# Patient Record
Sex: MALE | Race: BLACK OR AFRICAN AMERICAN | URBAN - METROPOLITAN AREA
[De-identification: names, ages, dates, MRNs, and addresses within clinical notes are randomized per-mention and may not be internally consistent; named-entity substitution may affect disease eponyms.]

---

## 2023-01-31 ENCOUNTER — OFFICE (OUTPATIENT)
Dept: URBAN - METROPOLITAN AREA CLINIC 92 | Facility: CLINIC | Age: 58
Setting detail: OPHTHALMOLOGY
End: 2023-01-31
Payer: MEDICAID

## 2023-01-31 DIAGNOSIS — H25.13: ICD-10-CM

## 2023-01-31 DIAGNOSIS — H40.033: ICD-10-CM

## 2023-01-31 DIAGNOSIS — H52.4: ICD-10-CM

## 2023-01-31 PROCEDURE — 92250 FUNDUS PHOTOGRAPHY W/I&R: CPT | Performed by: SPECIALIST

## 2023-01-31 PROCEDURE — 92015 DETERMINE REFRACTIVE STATE: CPT | Performed by: SPECIALIST

## 2023-01-31 PROCEDURE — 92012 INTRM OPH EXAM EST PATIENT: CPT | Performed by: SPECIALIST

## 2023-01-31 ASSESSMENT — REFRACTION_MANIFEST
OS_AXIS: 133
OD_SPHERE: +1.50
OD_CYLINDER: +0.50
OD_AXIS: 52
OS_CYLINDER: +0.75
OS_CYLINDER: +0.75
OS_AXIS: 145
OS_VA1: 20/20
OS_AXIS: 135
OS_CYLINDER: +0.75
OS_CYLINDER: +0.50
OD_ADD: +3.00
OS_ADD: +2.75
OD_AXIS: 070
OD_CYLINDER: +0.75
OS_ADD: +3.00
OD_SPHERE: +2.75
OS_VA1: 20/20
OS_CYLINDER: +1.25
OD_ADD: +2.00
OD_VA1: 20/20
OD_ADD: +2.75
OS_SPHERE: +5.00
OS_SPHERE: +2.25
OS_AXIS: 145
OD_SPHERE: +4.25
OD_ADD: +2.75
OD_SPHERE: +2.00
OD_SPHERE: +2.00
OD_VA1: 20/20
OD_CYLINDER: +0.75
OD_ADD: +2.75
OD_CYLINDER: +0.75
OD_AXIS: 060
OS_ADD: +2.75
OS_AXIS: 140
OS_SPHERE: +1.75
OS_ADD: +2.00
OS_SPHERE: +2.50
OD_AXIS: 070
OD_AXIS: 052
OS_SPHERE: +3.00
OD_CYLINDER: +0.75
OS_ADD: +2.75

## 2023-01-31 ASSESSMENT — TONOMETRY
OD_IOP_MMHG: 17
OS_IOP_MMHG: 16

## 2023-01-31 ASSESSMENT — REFRACTION_AUTOREFRACTION
OD_AXIS: 033
OD_SPHERE: +2.50
OS_SPHERE: +2.75
OS_CYLINDER: +0.75
OD_CYLINDER: +0.75
OS_AXIS: 152

## 2023-01-31 ASSESSMENT — SPHEQUIV_DERIVED
OS_SPHEQUIV: 3.375
OD_SPHEQUIV: 1.875
OD_SPHEQUIV: 2.25
OD_SPHEQUIV: 3.125
OS_SPHEQUIV: 3.125
OS_SPHEQUIV: 5.375
OS_SPHEQUIV: 2.375
OD_SPHEQUIV: 4.625
OD_SPHEQUIV: 2.875
OD_SPHEQUIV: 2.375
OS_SPHEQUIV: 2.625
OS_SPHEQUIV: 2.75

## 2023-01-31 ASSESSMENT — REFRACTION_CURRENTRX
OD_OVR_VA: 20/
OS_AXIS: 146
OS_CYLINDER: +0.75
OD_VPRISM_DIRECTION: PROGS
OD_AXIS: 063
OS_VPRISM_DIRECTION: PROGS
OD_SPHERE: +2.75
OS_SPHERE: +3.00
OS_ADD: +3.00
OD_CYLINDER: +0.75
OS_OVR_VA: 20/
OD_ADD: +3.00

## 2023-01-31 ASSESSMENT — PACHYMETRY
OS_CT_UM: 511
OD_CT_UM: 508
OS_CT_CORRECTION: 2
OD_CT_CORRECTION: 3

## 2023-01-31 ASSESSMENT — CONFRONTATIONAL VISUAL FIELD TEST (CVF)
OS_FINDINGS: FULL
OD_FINDINGS: FULL

## 2023-10-26 ENCOUNTER — OFFICE (OUTPATIENT)
Dept: URBAN - METROPOLITAN AREA CLINIC 92 | Facility: CLINIC | Age: 58
Setting detail: OPHTHALMOLOGY
End: 2023-10-26
Payer: MEDICAID

## 2023-10-26 DIAGNOSIS — H25.13: ICD-10-CM

## 2023-10-26 DIAGNOSIS — H40.033: ICD-10-CM

## 2023-10-26 DIAGNOSIS — E11.9: ICD-10-CM

## 2023-10-26 PROCEDURE — 92133 CPTRZD OPH DX IMG PST SGM ON: CPT | Performed by: SPECIALIST

## 2023-10-26 PROCEDURE — 92014 COMPRE OPH EXAM EST PT 1/>: CPT | Performed by: SPECIALIST

## 2023-10-26 PROCEDURE — 92083 EXTENDED VISUAL FIELD XM: CPT | Performed by: SPECIALIST

## 2023-10-26 ASSESSMENT — TONOMETRY
OS_IOP_MMHG: 12
OD_IOP_MMHG: 13

## 2023-10-26 ASSESSMENT — PACHYMETRY
OS_CT_CORRECTION: 2
OD_CT_CORRECTION: 3
OD_CT_UM: 508
OS_CT_UM: 511

## 2023-10-27 ASSESSMENT — REFRACTION_MANIFEST
OD_CYLINDER: +0.75
OS_SPHERE: +2.50
OS_VA1: 20/20
OS_ADD: +2.75
OD_ADD: +2.75
OD_AXIS: 052
OD_CYLINDER: +0.75
OS_AXIS: 145
OS_CYLINDER: +0.50
OS_CYLINDER: +0.75
OD_CYLINDER: +0.50
OS_AXIS: 145
OD_CYLINDER: +0.75
OS_AXIS: 135
OD_ADD: +2.75
OD_ADD: +2.75
OD_AXIS: 070
OD_AXIS: 060
OS_ADD: +2.00
OD_SPHERE: +2.00
OD_AXIS: 070
OD_AXIS: 52
OS_VA1: 20/20
OD_VA1: 20/20
OS_CYLINDER: +0.75
OD_ADD: +2.00
OS_CYLINDER: +1.25
OS_SPHERE: +1.75
OS_SPHERE: +5.00
OS_ADD: +2.75
OD_VA1: 20/20
OS_AXIS: 140
OD_CYLINDER: +0.75
OS_SPHERE: +3.00
OS_ADD: +3.00
OS_SPHERE: +2.25
OS_ADD: +2.75
OS_CYLINDER: +0.75
OD_SPHERE: +1.50
OS_AXIS: 133
OD_SPHERE: +2.00
OD_SPHERE: +2.75
OD_SPHERE: +4.25
OD_ADD: +3.00

## 2023-10-27 ASSESSMENT — KERATOMETRY
OD_K1POWER_DIOPTERS: 44.00
OD_AXISANGLE_DEGREES: 090
OD_K2POWER_DIOPTERS: 44.00
OS_K2POWER_DIOPTERS: 43.25
OS_K1POWER_DIOPTERS: 43.25
OS_AXISANGLE_DEGREES: 090
METHOD_AUTO_MANUAL: AUTO

## 2023-10-27 ASSESSMENT — AXIALLENGTH_DERIVED
OD_AL: 22.5327
OS_AL: 22.6514
OS_AL: 22.4291
OD_AL: 22.5772
OD_AL: 21.7605
OS_AL: 22.4732
OS_AL: 22.7869
OS_AL: 22.6964
OD_AL: 22.7118
OD_AL: 22.3128
OS_AL: 21.7463
OD_AL: 22.2693

## 2023-10-27 ASSESSMENT — REFRACTION_AUTOREFRACTION
OS_CYLINDER: +1.00
OS_SPHERE: +2.75
OD_SPHERE: +2.75
OS_AXIS: 164
OD_CYLINDER: +0.50
OD_AXIS: 019

## 2023-10-27 ASSESSMENT — REFRACTION_CURRENTRX
OS_SPHERE: +3.00
OS_VPRISM_DIRECTION: PROGS
OD_SPHERE: +2.75
OS_SPHERE: +2.50
OS_ADD: +3.00
OD_OVR_VA: 20/
OS_VPRISM_DIRECTION: PROGS
OS_ADD: +2.75
OS_AXIS: 146
OD_AXIS: 063
OS_AXIS: 136
OS_CYLINDER: +0.75
OD_SPHERE: +2.00
OD_ADD: +2.75
OD_ADD: +3.00
OS_OVR_VA: 20/
OD_AXIS: 047
OS_OVR_VA: 20/
OS_CYLINDER: +0.50
OD_VPRISM_DIRECTION: PROGS
OD_VPRISM_DIRECTION: PROGS
OD_OVR_VA: 20/
OD_CYLINDER: +0.75
OD_CYLINDER: +0.75

## 2023-10-27 ASSESSMENT — SPHEQUIV_DERIVED
OD_SPHEQUIV: 1.875
OD_SPHEQUIV: 4.625
OD_SPHEQUIV: 2.25
OS_SPHEQUIV: 3.25
OS_SPHEQUIV: 5.375
OD_SPHEQUIV: 3.125
OS_SPHEQUIV: 2.375
OS_SPHEQUIV: 2.75
OD_SPHEQUIV: 3
OS_SPHEQUIV: 3.375
OD_SPHEQUIV: 2.375
OS_SPHEQUIV: 2.625

## 2023-10-27 ASSESSMENT — VISUAL ACUITY
OD_BCVA: 20/20-2
OS_BCVA: 20/20-2

## 2024-01-18 ENCOUNTER — OFFICE (OUTPATIENT)
Dept: URBAN - METROPOLITAN AREA CLINIC 92 | Facility: CLINIC | Age: 59
Setting detail: OPHTHALMOLOGY
End: 2024-01-18
Payer: MEDICAID

## 2024-01-18 DIAGNOSIS — H40.033: ICD-10-CM

## 2024-01-18 DIAGNOSIS — H52.4: ICD-10-CM

## 2024-01-18 DIAGNOSIS — H25.13: ICD-10-CM

## 2024-01-18 DIAGNOSIS — E11.9: ICD-10-CM

## 2024-01-18 PROCEDURE — 92012 INTRM OPH EXAM EST PATIENT: CPT | Performed by: SPECIALIST

## 2024-01-18 PROCEDURE — 92133 CPTRZD OPH DX IMG PST SGM ON: CPT | Performed by: SPECIALIST

## 2024-01-18 PROCEDURE — 92015 DETERMINE REFRACTIVE STATE: CPT | Performed by: SPECIALIST

## 2024-01-18 ASSESSMENT — REFRACTION_CURRENTRX
OD_VPRISM_DIRECTION: PROGS
OD_SPHERE: +2.75
OS_SPHERE: +2.50
OS_SPHERE: +3.00
OS_CYLINDER: +0.50
OD_SPHERE: +2.75
OS_ADD: +2.75
OS_OVR_VA: 20/
OD_CYLINDER: +0.75
OS_ADD: +2.75
OS_OVR_VA: 20/
OD_OVR_VA: 20/
OD_SPHERE: +2.00
OD_OVR_VA: 20/
OS_AXIS: 45+
OS_VPRISM_DIRECTION: PROGS
OD_ADD: +2.75
OS_ADD: +3.00
OS_CYLINDER: +0.75
OD_CYLINDER: -0.75
OS_CYLINDER: -0.50
OD_AXIS: 047
OS_SPHERE: +3.00
OD_VPRISM_DIRECTION: PROGS
OD_ADD: +2.50
OD_CYLINDER: +0.75
OS_OVR_VA: 20/
OS_AXIS: 136
OD_AXIS: 135
OD_AXIS: 063
OD_OVR_VA: 20/
OD_ADD: +3.00
OS_AXIS: 146
OS_VPRISM_DIRECTION: PROGS

## 2024-01-18 ASSESSMENT — REFRACTION_MANIFEST
OD_AXIS: 070
OD_CYLINDER: +0.75
OD_SPHERE: +4.25
OD_ADD: +2.75
OD_VA1: 20/20
OS_CYLINDER: -0.75
OD_ADD: +3.00
OS_SPHERE: +2.25
OD_ADD: +2.75
OD_SPHERE: +1.50
OS_CYLINDER: +0.50
OD_SPHERE: +3.00
OS_VA1: 20/20
OS_ADD: +2.75
OS_ADD: +2.75
OD_SPHERE: +2.75
OD_SPHERE: +2.00
OD_ADD: +2.75
OS_ADD: +2.75
OD_CYLINDER: -0.75
OS_AXIS: 145
OS_ADD: +2.75
OS_CYLINDER: +0.75
OS_AXIS: 140
OD_CYLINDER: +0.75
OD_ADD: +2.75
OD_CYLINDER: +0.75
OD_CYLINDER: +0.75
OS_ADD: +2.00
OD_VA1: 20/20
OS_VA1: 20/20
OD_VA1: 20/20
OD_AXIS: 060
OD_ADD: +2.00
OD_AXIS: 137
OS_AXIS: 133
OS_CYLINDER: +0.75
OS_SPHERE: +3.50
OD_AXIS: 52
OS_AXIS: 047
OD_AXIS: 070
OS_ADD: +3.00
OS_VA1: 20/20
OD_AXIS: 052
OS_AXIS: 135
OS_SPHERE: +3.00
OS_SPHERE: +5.00
OS_SPHERE: +1.75
OS_SPHERE: +2.50
OD_SPHERE: +2.00
OS_CYLINDER: +1.25
OD_CYLINDER: +0.50
OS_CYLINDER: +0.75
OS_AXIS: 145

## 2024-01-18 ASSESSMENT — SPHEQUIV_DERIVED
OD_SPHEQUIV: 4.625
OD_SPHEQUIV: 2.625
OD_SPHEQUIV: 3.125
OS_SPHEQUIV: 5.375
OS_SPHEQUIV: 2.375
OS_SPHEQUIV: 3.75
OD_SPHEQUIV: 3.125
OD_SPHEQUIV: 2.375
OS_SPHEQUIV: 3.375
OS_SPHEQUIV: 3.125
OS_SPHEQUIV: 2.75
OS_SPHEQUIV: 2.625
OD_SPHEQUIV: 2.25
OD_SPHEQUIV: 1.875

## 2024-01-18 ASSESSMENT — REFRACTION_AUTOREFRACTION
OS_CYLINDER: +1.50
OS_SPHERE: +3.00
OD_AXIS: 24
OD_SPHERE: +2.75
OS_AXIS: 160
OD_CYLINDER: +0.75

## 2024-01-18 ASSESSMENT — CONFRONTATIONAL VISUAL FIELD TEST (CVF)
OD_FINDINGS: FULL
OS_FINDINGS: FULL

## 2024-08-30 ENCOUNTER — OFFICE (OUTPATIENT)
Dept: URBAN - METROPOLITAN AREA CLINIC 92 | Facility: CLINIC | Age: 59
Setting detail: OPHTHALMOLOGY
End: 2024-08-30
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
## 2024-08-30 PROCEDURE — NO SHOW FE NO SHOW FEE: Performed by: SPECIALIST

## 2025-05-24 ENCOUNTER — EMERGENCY (EMERGENCY)
Facility: HOSPITAL | Age: 60
LOS: 1 days | End: 2025-05-24
Attending: EMERGENCY MEDICINE | Admitting: EMERGENCY MEDICINE
Payer: COMMERCIAL

## 2025-05-24 VITALS
RESPIRATION RATE: 18 BRPM | TEMPERATURE: 97 F | DIASTOLIC BLOOD PRESSURE: 81 MMHG | SYSTOLIC BLOOD PRESSURE: 121 MMHG | OXYGEN SATURATION: 98 % | HEART RATE: 70 BPM

## 2025-05-24 PROCEDURE — 99284 EMERGENCY DEPT VISIT MOD MDM: CPT

## 2025-05-24 PROCEDURE — 70450 CT HEAD/BRAIN W/O DYE: CPT | Mod: 26

## 2025-05-24 NOTE — ED PROVIDER NOTE - PHYSICAL EXAMINATION
VITAL SIGNS: I have reviewed nursing notes and confirm.  CONSTITUTIONAL: Well-developed; well-nourished; in no acute distress.  HEAD: Normocephalic; small hematoma to forehead with overlying superficial abrasion.  EYES: EOM intact; conjunctiva and sclera clear.  ENT: nose appears normal  NECK: Supple, no midline c-spine tenderness  CARD: S1, S2 normal; no murmurs, gallops, or rubs. Regular rate and rhythm.  RESP: Breathing comfortably on RA  EXT: No deformities  NEURO: Alert, oriented.  PSYCH: Cooperative, appropriate.

## 2025-05-24 NOTE — ED ADULT TRIAGE NOTE - CHIEF COMPLAINT QUOTE
Pt bib EMS c/o ams. Pt reports etoh use today. Pt presents with redness on his forehead. EMS states bystanders witnessed him fall. Pt denies loc or ac use. Pt arrives awake and alert

## 2025-05-24 NOTE — ED PROVIDER NOTE - HISTORY ATTESTATION, MLM
Patient wants to reschedule testing at [facility] til later due to Prudence concerns, gave patient # and she is going to call & reschedule. I have reviewed and confirmed nurses' notes...

## 2025-05-24 NOTE — ED ADULT NURSE NOTE - OBJECTIVE STATEMENT
Pt BIBA for AMS. Pt endorses EtOH use yesterday night; reports having 5 drinks of vodka. Pt presents with abrasion to left forehead; pt unable to recall events leading up to sustaining injury. No other deformities, active bleeding, bruising present to head or neck. Pt also c/o chronic b/l knee pain. Denies history of withdrawal symptoms. Pt reports recent increase in EtOH use due to life stressors. Pt able to walk with stable gait.

## 2025-05-24 NOTE — ED PROVIDER NOTE - CLINICAL SUMMARY MEDICAL DECISION MAKING FREE TEXT BOX
Refugio Sanders presents with disorientation and intoxication, likely due to recent alcohol consumption following the loss of his mother. There is also concern about a potentially recent head injury, despite the patient's claim that it is old. Differential diagnoses include acute alcohol intoxication, head injury, and possible underlying medical conditions exacerbated by alcohol use.     Plan: 1) Perform a CT scan of the head to rule out acute intracranial pathology. 2) Monitor vital signs and perform a thorough physical examination. 3) Recommend follow-up with primary care physician within the next week for further evaluation and management of diabetes and other potential underlying conditions.

## 2025-05-24 NOTE — ED PROVIDER NOTE - PATIENT PORTAL LINK FT
You can access the FollowMyHealth Patient Portal offered by [facility] by registering at the following website: [email or web address]. By joining [employer]’s FollowMyHealth portal, you will also be able to view your health information using other applications (apps) compatible with our system.

## 2025-05-24 NOTE — ED PROVIDER NOTE - OBJECTIVE STATEMENT
Refugio Sanders presents to the Emergency Room with disorientation and intoxication. [place] The patient reports recent alcohol consumption due to his mother's passing. He denies trauma but when the hematoma to his forehead was pointed out, he states it is hold despite the overlying abrasion. The patient denies any other symptoms and expresses a desire to go home. The onset of his current condition is unclear, but it seems to be related to recent alcohol intake. No information is provided about the patient's eating, drinking, or medication-taking habits during this episode.

## 2025-05-26 DIAGNOSIS — S00.83XA CONTUSION OF OTHER PART OF HEAD, INITIAL ENCOUNTER: ICD-10-CM

## 2025-05-26 DIAGNOSIS — R41.0 DISORIENTATION, UNSPECIFIED: ICD-10-CM

## 2025-05-26 DIAGNOSIS — F10.129 ALCOHOL ABUSE WITH INTOXICATION, UNSPECIFIED: ICD-10-CM
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
